# Patient Record
(demographics unavailable — no encounter records)

---

## 2025-03-13 NOTE — REASON FOR VISIT
[Consultation: ________] : [unfilled] is a new patient being seen for a [unfilled] consultation visit [Mother] : mother [Language Line ] : provided by Language Line   [Time Spent: ____ minutes] : Total time spent using  services: [unfilled] minutes. The patient's primary language is not English thus required  services. [Interpreters_IDNumber] : [id number], [id number] [Interpreters_FullName] : Rick Shen [TWNoteComboBox1] : Burundian

## 2025-03-13 NOTE — PHYSICAL EXAM
[PERRLA] : PETROS [S1, S2 Present] : S1, S2 present [Clear to auscultation] : clear to auscultation [Soft] : soft [NonTender] : non tender [Non Distended] : non distended [Normal Bowel Sounds] : normal bowel sounds [No Hepatosplenomegaly] : no hepatosplenomegaly [No Abnormal Lymph Nodes Palpated] : no abnormal lymph nodes palpated [Range Of Motion] : full range of motion [Intact Judgement] : intact judgement  [Insight Insight] : intact insight [Cranial nerves grossly intact] : cranial nerves grossly intact [Not Examined] : not examined [Acute distress] : no acute distress [Erythematous Conjunctiva] : nonerythematous conjunctiva [Erythematous Oropharynx] : nonerythematous oropharynx [Lesions] : no lesions [Murmurs] : no murmurs [Joint effusions] : no joint effusions [FreeTextEntry1] : appears well  [de-identified] : large healing plaques on left upper eyelid, bilateral arms, abdomen, and bilatearl knees; diffuse dryness and scale on scalp [de-identified] : no signs of arthritis; no dactylitis

## 2025-03-13 NOTE — CONSULT LETTER
[Dear  ___] : Dear  [unfilled], [Courtesy Letter:] : I had the pleasure of seeing your patient, [unfilled], in my office today. [Please see my note below.] : Please see my note below. [Consult Closing:] : Thank you very much for allowing me to participate in the care of this patient.  If you have any questions, please do not hesitate to contact me. [Sincerely,] : Sincerely, [FreeTextEntry2] : Boogie Monzon MD [address] [place] [phone] [FreeTextEntry3] : Nilam Suggs MD Attending Physician, Pediatric Rheumatology [facility] | [facility]

## 2025-03-13 NOTE — HISTORY OF PRESENT ILLNESS
[Psoriasis] : Psoriasis [Unlimited ADLs] : able to do activities of daily living without limitations [FreeTextEntry1] : Ulisses is an 8-year-old male who presents for evaluation of 'bone pain.'   Ulisses has been having 'bone pain' for the last 6 months. He has pain in legs but no swelling. The pain comes and goes - worse in the mornings - feels stiff in the morning. Sometimes lasts until he is dropped off at school. Pain occurs a couple times a week, not every day. No difficulty with ADLs but has pain with running or walking. X-rays and labs were done with PMD about 2-3 weeks ago - mother states she was told she would receive a call if anything was abnormal.   Ulisses was also diagnosed with psoriasis about 8 months ago  initially thought his rashes were eczema and a skin biopsy was done which confirmed psoriasis - follows with a dermatologist (Dr.Michael Meier). He has tried various topicals and laser therapy without improvement. About 1 month ago, started Cosentyx. After the first couple doses of Cosentyx loading dose, Ulisses has had improvement in lesions on his arms/legs. He still has a persistent plaque over his left eye and scalp.   No fever, headache, visual changes, mouth sores, cough, congestion, chest pain, difficulty breathing, nausea, vomiting, diarrhea, constipation, blood in the stool, abdominal pain, dysuria, hematuria, joint swelling, back pain, or rash.   Birth History: 36 weeks, required phototherapy for hyperbilirubinemia Past Medical History: Psoriasis, Environmental allergies - receiving allergy shots (stopped temporarily due to recent Cosentyx initiation)  Past Surgical History: None  Family History: Psoriasis - mother; Hypothyroidism - maternal uncle Social History: 3rd grade. Lives with parents and two brothers. Immigrated from [facility] 4 years ago.  Medications: Cosentyx 75 mg SC weekly x 5 weeks (4/5 doses so far), then every 4 weeks  Allergies: NKDA

## 2025-03-13 NOTE — PHYSICAL EXAM
[PERRLA] : PETROS [S1, S2 Present] : S1, S2 present [Clear to auscultation] : clear to auscultation [Soft] : soft [NonTender] : non tender [Non Distended] : non distended [Normal Bowel Sounds] : normal bowel sounds [No Hepatosplenomegaly] : no hepatosplenomegaly [No Abnormal Lymph Nodes Palpated] : no abnormal lymph nodes palpated [Range Of Motion] : full range of motion [Intact Judgement] : intact judgement  [Insight Insight] : intact insight [Cranial nerves grossly intact] : cranial nerves grossly intact [Not Examined] : not examined [Acute distress] : no acute distress [Erythematous Conjunctiva] : nonerythematous conjunctiva [Erythematous Oropharynx] : nonerythematous oropharynx [Lesions] : no lesions [Murmurs] : no murmurs [Joint effusions] : no joint effusions [FreeTextEntry1] : appears well  [de-identified] : large healing plaques on left upper eyelid, bilateral arms, abdomen, and bilatearl knees; diffuse dryness and scale on scalp [de-identified] : no signs of arthritis; no dactylitis

## 2025-03-13 NOTE — CONSULT LETTER
[Dear  ___] : Dear  [unfilled], [Courtesy Letter:] : I had the pleasure of seeing your patient, [unfilled], in my office today. [Please see my note below.] : Please see my note below. [Consult Closing:] : Thank you very much for allowing me to participate in the care of this patient.  If you have any questions, please do not hesitate to contact me. [Sincerely,] : Sincerely, [FreeTextEntry2] : Boogie Monzon MD [address] [place] [phone] [FreeTextEntry3] : Nilam Suggs MD Attending Physician, Pediatric Rheumatology [facility] | [facility]

## 2025-03-13 NOTE — IMMUNIZATIONS
[Immunizations are up to date] : Immunizations are up to date [Records maintained by PMELLEN] : Records maintained by GILLIAN

## 2025-03-13 NOTE — HISTORY OF PRESENT ILLNESS
[Psoriasis] : Psoriasis [Unlimited ADLs] : able to do activities of daily living without limitations [FreeTextEntry1] : Ulisses is an 8-year-old male who presents for evaluation of 'bone pain.'   Ulisses has been having 'bone pain' for the last 6 months. He has pain in legs but no swelling. The pain comes and goes - worse in the mornings - feels stiff in the morning. Sometimes lasts until he is dropped off at school. Pain occurs a couple times a week, not every day. No difficulty with ADLs but has pain with running or walking. X-rays and labs were done with PMD about 2-3 weeks ago - mother states she was told she would receive a call if anything was abnormal.   Ulisses was also diagnosed with psoriasis about 8 months ago  initially thought his rashes were eczema and a skin biopsy was done which confirmed psoriasis - follows with a dermatologist (Dr.Michael Meier). He has tried various topicals and laser therapy without improvement. About 1 month ago, started Cosentyx. After the first couple doses of Cosentyx loading dose, Ulisses has had improvement in lesions on his arms/legs. He still has a persistent plaque over his left eye and scalp.   No fever, headache, visual changes, mouth sores, cough, congestion, chest pain, difficulty breathing, nausea, vomiting, diarrhea, constipation, blood in the stool, abdominal pain, dysuria, hematuria, joint swelling, back pain, or rash.   Birth History: 36 weeks, required phototherapy for hyperbilirubinemia Past Medical History: Psoriasis, Environmental allergies - receiving allergy shots (stopped temporarily due to recent Cosentyx initiation)  Past Surgical History: None  Family History: Psoriasis - mother; Hypothyroidism - maternal uncle Social History: 3rd grade. Lives with parents and two brothers. Immigrated from [facility] 4 years ago.  Medications: Cosentyx 75 mg SC weekly x 5 weeks (4/5 doses so far), then every 4 weeks  Allergies: NKDA

## 2025-03-13 NOTE — REASON FOR VISIT
[Consultation: ________] : [unfilled] is a new patient being seen for a [unfilled] consultation visit [Mother] : mother [Language Line ] : provided by Language Line   [Time Spent: ____ minutes] : Total time spent using  services: [unfilled] minutes. The patient's primary language is not English thus required  services. [Interpreters_IDNumber] : [id number], [id number] [Interpreters_FullName] : Rick Shen [TWNoteComboBox1] : Guyanese

## 2025-06-18 NOTE — CONSULT LETTER
[Dear  ___] : Dear  [unfilled], [Courtesy Letter:] : I had the pleasure of seeing your patient, [unfilled], in my office today. [Please see my note below.] : Please see my note below. [Consult Closing:] : Thank you very much for allowing me to participate in the care of this patient.  If you have any questions, please do not hesitate to contact me. [Sincerely,] : Sincerely, [FreeTextEntry2] : Boogie Monzon MD [address] [place] [phone] [FreeTextEntry3] : Nilam Suggs MD Attending Physician, Pediatric Rheumatology [facility] | [facility]

## 2025-06-18 NOTE — PHYSICAL EXAM
[Acute distress] : no acute distress [PERRLA] : PETROS [Erythematous Conjunctiva] : nonerythematous conjunctiva [Erythematous Oropharynx] : nonerythematous oropharynx [Lesions] : no lesions [S1, S2 Present] : S1, S2 present [Murmurs] : no murmurs [Clear to auscultation] : clear to auscultation [Soft] : soft [NonTender] : non tender [Non Distended] : non distended [Normal Bowel Sounds] : normal bowel sounds [No Hepatosplenomegaly] : no hepatosplenomegaly [No Abnormal Lymph Nodes Palpated] : no abnormal lymph nodes palpated [Range Of Motion] : full range of motion [Joint effusions] : no joint effusions [Cranial nerves grossly intact] : cranial nerves grossly intact [Intact Judgement] : intact judgement  [Insight Insight] : intact insight [Not Examined] : not examined [FreeTextEntry1] : appears well  [de-identified] : large healing plaques on left upper eyelid, bilateral arms, abdomen, and bilatearl knees; diffuse dryness and scale on scalp [de-identified] : no signs of arthritis; no dactylitis

## 2025-06-18 NOTE — HISTORY OF PRESENT ILLNESS
[FreeTextEntry1] : Ulisses is an 8-year-old male who presents for evaluation of 'bone pain.'   Ulisses has been having 'bone pain' for the last 6 months. He has pain in legs but no swelling. The pain comes and goes - worse in the mornings - feels stiff in the morning. Sometimes lasts until he is dropped off at school. Pain occurs a couple times a week, not every day. No difficulty with ADLs but has pain with running or walking. X-rays and labs were done with PMD about 2-3 weeks ago - mother states she was told she would receive a call if anything was abnormal.   Ulisses was also diagnosed with psoriasis about 8 months ago  initially thought his rashes were eczema and a skin biopsy was done which confirmed psoriasis - follows with a dermatologist (Dr.Michael Meier). He has tried various topicals and laser therapy without improvement. About 1 month ago, started Cosentyx. After the first couple doses of Cosentyx loading dose, Ulisses has had improvement in lesions on his arms/legs. He still has a persistent plaque over his left eye and scalp.   No fever, headache, visual changes, mouth sores, cough, congestion, chest pain, difficulty breathing, nausea, vomiting, diarrhea, constipation, blood in the stool, abdominal pain, dysuria, hematuria, joint swelling, back pain, or rash.   Birth History: 36 weeks, required phototherapy for hyperbilirubinemia Past Medical History: Psoriasis, Environmental allergies - receiving allergy shots (stopped temporarily due to recent Cosentyx initiation)  Past Surgical History: None  Family History: Psoriasis - mother; Hypothyroidism - maternal uncle Social History: 3rd grade. Lives with parents and two brothers. Immigrated from [facility] 4 years ago.  Medications: Cosentyx 75 mg SC weekly x 5 weeks (4/5 doses so far), then every 4 weeks  Allergies: NKDA [Psoriasis] : Psoriasis [Unlimited ADLs] : able to do activities of daily living without limitations

## 2025-06-18 NOTE — REASON FOR VISIT
[Consultation: ________] : [unfilled] is a new patient being seen for a [unfilled] consultation visit [Mother] : mother [Language Line ] : provided by Language Line   [Interpreters_IDNumber] : [id number], [id number] [Interpreters_FullName] : Rick Shen [TWNoteComboBox1] : Cymraes

## 2025-07-10 NOTE — PHYSICAL EXAM
[Acute distress] : no acute distress [Rash] : rash [Malar Erythema] : no malar erythema [Erythematous Conjunctiva] : nonerythematous conjunctiva [Eyelids] : normal eyelids [Pupils] : pupils were equal and round [Erythematous Oropharynx] : nonerythematous oropharynx [Gums] : normal gums [Mucosa] : moist and pink mucosa [Palate] : normal palate [Lesions] : no lesions [Murmurs] : no murmurs [Cardiac Auscultation] : normal cardiac auscultation  [Peripheral Edema] : no peripheral edema  [Respiratory Effort] : normal respiratory effort [Muscle Strength] : normal muscle strength [Gait] : normal gait [Joint effusions] : no joint effusions [FreeTextEntry1] : appears well  [de-identified] : large healing plaques on left upper eyelid, bilateral arms, abdomen, and bilatearl knees; diffuse dryness and scale on scalp [de-identified] : no dactylitis [NumbJointsActiveArthritis] : 0 [NumbJointsLimitedMotion] : 0 [de-identified] : FROM C-spine [de-identified] : negative FABERE bilaterally [de-identified] : none [de-identified] : no gross discrepancy

## 2025-07-10 NOTE — CONSULT LETTER
[FreeTextEntry2] : Boogie Monzon MD [address] [place] [phone] [FreeTextEntry3] : Raphael Dolan DO Attending Physician, Pediatric Rheumatology [facility] | [facility]  [DrKorin  ___] : Dr. RUTH

## 2025-07-10 NOTE — HISTORY OF PRESENT ILLNESS
[FreeTextEntry1] : Ulisses is here for follow-up today.    Denies any recent illnesses, fevers, rashes, oral lesions, chest pain, difficulty breathing, abdominal pain, n/v/d/c, hematuria, hematochezia, weakness, fatigue.

## 2025-07-10 NOTE — REASON FOR VISIT
[Follow-Up: _____] : [unfilled] is  being seen for a [unfilled] follow-up visit [Medical Records] : medical records [Interpreters_IDNumber] : [id number], [id number] [Interpreters_FullName] : Rick Shen [TWNoteComboBox1] : Ghanaian